# Patient Record
Sex: MALE | Race: BLACK OR AFRICAN AMERICAN | Employment: UNEMPLOYED | URBAN - METROPOLITAN AREA
[De-identification: names, ages, dates, MRNs, and addresses within clinical notes are randomized per-mention and may not be internally consistent; named-entity substitution may affect disease eponyms.]

---

## 2021-06-19 ENCOUNTER — HOSPITAL ENCOUNTER (EMERGENCY)
Age: 21
Discharge: HOME | End: 2021-06-19
Attending: EMERGENCY MEDICINE
Payer: MEDICAID

## 2021-06-19 VITALS
DIASTOLIC BLOOD PRESSURE: 93 MMHG | RESPIRATION RATE: 20 BRPM | TEMPERATURE: 97.6 F | HEART RATE: 88 BPM | SYSTOLIC BLOOD PRESSURE: 131 MMHG | OXYGEN SATURATION: 98 %

## 2021-06-19 DIAGNOSIS — J45.901 MILD ASTHMA WITH EXACERBATION, UNSPECIFIED WHETHER PERSISTENT: Primary | ICD-10-CM

## 2021-06-19 DIAGNOSIS — Z76.0 ENCOUNTER FOR MEDICATION REFILL: ICD-10-CM

## 2021-06-19 PROCEDURE — 99283 EMERGENCY DEPT VISIT LOW MDM: CPT

## 2021-06-19 PROCEDURE — 6370000000 HC RX 637 (ALT 250 FOR IP): Performed by: EMERGENCY MEDICINE

## 2021-06-19 RX ORDER — METHYLPREDNISOLONE 4 MG/1
4 TABLET ORAL SEE ADMIN INSTRUCTIONS
Qty: 1 KIT | Refills: 0 | Status: SHIPPED | OUTPATIENT
Start: 2021-06-19 | End: 2021-06-25

## 2021-06-19 RX ORDER — ALBUTEROL SULFATE 90 UG/1
2 AEROSOL, METERED RESPIRATORY (INHALATION) EVERY 4 HOURS PRN
Qty: 1 INHALER | Refills: 1 | Status: SHIPPED | OUTPATIENT
Start: 2021-06-19 | End: 2021-06-29

## 2021-06-19 RX ORDER — PREDNISONE 20 MG/1
60 TABLET ORAL ONCE
Status: COMPLETED | OUTPATIENT
Start: 2021-06-19 | End: 2021-06-19

## 2021-06-19 RX ADMIN — PREDNISONE 60 MG: 20 TABLET ORAL at 05:36

## 2021-06-19 NOTE — ED NOTES
brought by [facility] for SOB. received one duoneb on way to ED for wheezing. right now, lung sounds clear. pt reports waking up at 0400 with cough and SOB. pt states his cousin is also having same symptoms.    hx of asthma but hasn't needed an inhaler for a long time   EMS VS:  130/88 100% P80     Yoly Pearson RN  06/19/21 [id number]

## 2021-06-19 NOTE — ED PROVIDER NOTES
[facility] ENCOUNTER      Pt Name: Yesica Lyn  MRN: [id number]  [place] 2000  Date of evaluation: 6/19/2021  Provider: Hamzah Brandt DO    CHIEF COMPLAINT  History given by: PATIENT   Chief Complaint   Patient presents with    Shortness of Breath     brought by [facility] for SOB. received one duoneb on way to ED for wheezing. right now, lung sounds clear. pt reports waking up at 0400 with cough and SOB. pt states his cousin is also having same symptoms. hx of asthma but hasn't needed an inhaler for a long time   EMS VS:  130/88 100% P80       I wore personal protective equipment when I was in the room the entire time. This includes gloves, N95 mask, face shield, and a glove over my stethoscope for protection. HPI  Yesica Lyn is a 22 y.o. male who presents with wheezing and exacerbation of asthma started today. He does not have an inhaler at home. He states it started just before he came in. He was coughing and had \"tickling\" in his back. He denies any fever chills. His cough is been clear. Nothing makes it better or worse. He describes it as moderate. He is out of his rescue inhaler at home. He had a nebulizer treatment in route by EMS and states he feels normal now. REVIEW OF SYSTEMS  All systems negative except as marked. Reviewed Nurses' notes and concur. No LMP for male patient. PAST MEDICAL HISTORY  Past Medical History:   Diagnosis Date    Asthma        FAMILY HISTORY  History reviewed. No pertinent family history. SOCIAL HISTORY       SURGICAL HISTORY  History reviewed. No pertinent surgical history.     CURRENT MEDICATIONS  Current Outpatient Rx   Medication Sig Dispense Refill    albuterol sulfate  (90 Base) MCG/ACT inhaler Inhale 2 puffs into the lungs every 4 hours as needed for Wheezing 1 Inhaler 1    methylPREDNISolone (MEDROL, ANYI,) 4 MG tablet Take 1 tablet by mouth See Admin Instructions for 6 days By mouth as directed on the package. 1 kit 0       ALLERGIES  No Known Allergies    PHYSICAL EXAM  VITAL SIGNS: BP (!) 136/101   Pulse 88   Temp 97.6 °F (36.4 °C) (Oral)   Resp 20   SpO2 98%   Constitutional: Well-developed, well-nourished, appears normal, nontoxic, activity: Resting company on the cart, no obvious pain, speaking full sentences, there is no forced expiration at the end of sentences. HENT: Normocephalic, Atraumatic, Bilateral external ears normal, TM's were normal, Mucus membranes are moist and oropharynx is patent and clear, No pharyngeal erythema, No oral exudates, Nose normal.  Eyes: Conjunctiva normal, No discharge. No scleral icterus. Neck: Normal range of motion, No tenderness, Supple. Lymphatic: No lymphadenopathy noted. Cardiovascular: Normal heart rate, Normal rhythm, no murmurs, no gallops, no rubs. Thorax & Lungs: Normal breath sounds, no respiratory distress, no wheezing, no rales, no rhonchi  Skin: Warm, Dry, No erythema, No rash. Extremities: No edema, No tenderness  Musculoskeletal:  no major deformities noted. Neurologic: Alert & oriented x 3  Psychiatric: Affect normal, Mood normal.        COURSE & MEDICAL DECISION MAKING    Vitals:    06/19/21 0515   BP: (!) 136/101   Pulse: 88   Resp: 20   Temp: 97.6 °F (36.4 °C)   TempSrc: Oral   SpO2: 98%       Medications   predniSONE (DELTASONE) tablet 60 mg (has no administration in time range)       New Prescriptions    ALBUTEROL SULFATE  (90 BASE) MCG/ACT INHALER    Inhale 2 puffs into the lungs every 4 hours as needed for Wheezing    METHYLPREDNISOLONE (MEDROL, ANYI,) 4 MG TABLET    Take 1 tablet by mouth See Admin Instructions for 6 days By mouth as directed on the package. SEP-1 CORE MEASURE DATA  Exclusion criteria: the patient is NOT to be included for sepsis due to:   Infection is not suspected    Patient remained stable in the ED. patient's lung sounds are perfectly clear on arrival to the

## 2021-06-21 NOTE — ED NOTES
EMD to bedside examining pt. No pain.    No visible/obvious SOB since arrival to ED     Zaki Helms RN  06/20/21 4042

## 2021-06-21 NOTE — ED NOTES
Discharge instructions with pt. Explained rx's. Encouraged follow up with his PCP. Also gave him [facility], [facility] info, [facility] list referrals in case he needs another PCP. Hasn't seen a PCP in a very long time. Explained rx's. No pain or SOB. Pt wants to walk to his cousin's house which is very close to here. Gait steady.        Jayden Garcia RN  06/20/21 4141